# Patient Record
Sex: FEMALE
[De-identification: names, ages, dates, MRNs, and addresses within clinical notes are randomized per-mention and may not be internally consistent; named-entity substitution may affect disease eponyms.]

---

## 2021-02-23 ENCOUNTER — HOSPITAL ENCOUNTER (INPATIENT)
Dept: HOSPITAL 97 - 2ND-WC | Age: 26
LOS: 2 days | Discharge: HOME | End: 2021-02-25
Attending: SPECIALIST | Admitting: SPECIALIST
Payer: COMMERCIAL

## 2021-02-23 VITALS — BODY MASS INDEX: 26.9 KG/M2

## 2021-02-23 DIAGNOSIS — Z3A.39: ICD-10-CM

## 2021-02-23 DIAGNOSIS — Z23: ICD-10-CM

## 2021-02-23 DIAGNOSIS — Z67.90: ICD-10-CM

## 2021-02-23 DIAGNOSIS — Z20.822: ICD-10-CM

## 2021-02-23 PROCEDURE — 85025 COMPLETE CBC W/AUTO DIFF WBC: CPT

## 2021-02-23 PROCEDURE — 86592 SYPHILIS TEST NON-TREP QUAL: CPT

## 2021-02-23 PROCEDURE — 87086 URINE CULTURE/COLONY COUNT: CPT

## 2021-02-23 PROCEDURE — 81001 URINALYSIS AUTO W/SCOPE: CPT

## 2021-02-23 PROCEDURE — 87340 HEPATITIS B SURFACE AG IA: CPT

## 2021-02-23 PROCEDURE — 36415 COLL VENOUS BLD VENIPUNCTURE: CPT

## 2021-02-23 PROCEDURE — 87088 URINE BACTERIA CULTURE: CPT

## 2021-02-23 PROCEDURE — 86901 BLOOD TYPING SEROLOGIC RH(D): CPT

## 2021-02-24 LAB
HCT VFR BLD CALC: 33.7 % (ref 36–45)
LYMPHOCYTES # SPEC AUTO: 2.1 K/UL (ref 0.7–4.9)
PMV BLD: 9 FL (ref 7.6–11.3)
RBC # BLD: 3.79 M/UL (ref 3.86–4.86)

## 2021-02-24 PROCEDURE — 3E0234Z INTRODUCTION OF SERUM, TOXOID AND VACCINE INTO MUSCLE, PERCUTANEOUS APPROACH: ICD-10-PCS

## 2021-02-24 PROCEDURE — 0HQ9XZZ REPAIR PERINEUM SKIN, EXTERNAL APPROACH: ICD-10-PCS

## 2021-02-24 PROCEDURE — 10907ZC DRAINAGE OF AMNIOTIC FLUID, THERAPEUTIC FROM PRODUCTS OF CONCEPTION, VIA NATURAL OR ARTIFICIAL OPENING: ICD-10-PCS

## 2021-02-24 RX ADMIN — IBUPROFEN PRN MG: 200 TABLET, SUGAR COATED ORAL at 19:47

## 2021-02-24 NOTE — PREOPHP
Date of Admission:  2021



History Of Present Illness:  This is a 26-year-old  2, para 0, 1 spontaneous miscarriage, 39 w
eeks and 2 days, for delivery.  Full admission talk given.



Family History:  Maternal grandmother with hypertension.  Maternal grandmother also with multiple mye
eneida obtained.  One brother has a congenital birth defect only having 1 arm.



Past Medical History:  No serous past medical illnesses.  No STDs.



Past Surgical History:  No surgery.



Allergies:  NO ALLERGIES KNOWN.



Physical Examination:

HEENT:  Clear.  Pupils equal, round, reactive to light and accommodation.  Conjunctivae well perfused
.  No oral, lingual, or buccal lesions. 

Chest and Lungs:  Clear. 

Heart:  Without murmurs, thrills, heaves, or rubs. 

Breasts:  Without masses on previous visits. 

Abdomen:  Term size. 

Extremities:  Clear without edema, cyanosis, or clubbing. 

Vital Signs:  All stable. 

Pelvic:  The cervix is 1.5 to 2, 50% to 60% effaced, vertex well applied, -1 station, ruptured membra
stephanie, clear fluid.



Assessment/plan:  Anticipate the patient should go into more active labor pattern now and delivery so
metime later today.  Beta strep negative, immune to Rubella, Rh positive and COVID negative.





NBC/MODL

DD:  2021 07:14:00Voice ID:  [id number]

## 2021-02-24 NOTE — PN
The patient is tami regularly.  Baby had an episode of decelerations, but we shut off the Cameron
nasima and I have started back in for more than an hour.  Baby looks quite good.  Fluid is still clear. 
 The patient has progressed to 3.5 possibly to 4 cm, 80% to 90% effaced, baby is at 0 station.  The p
atient has had Stadol and Phenergan.  At this point, she wants epidural and I think this is a good ti
me to administer the epidural.  We will start hydration and anesthesia has been called.





NBC/MODL

DD:  02/24/2021 11:26:23Voice ID:  [id number]

DT:  02/24/2021 11:53:11Report ID:  [id number]

## 2021-02-24 NOTE — XMS REPORT
Continuity of Care Document

                          Created on:2021



Patient:ROBIN MUELLER

Sex:Female

:1995

External Reference #:[id number]





Demographics







                          Address                   [address] [address]

 

                          Home Phone                [phone]

 

                          Work Phone                [phone]

 

                          Mobile Phone              [phone]

 

                          Email Address             [email or web address]

 

                          Preferred Language        English

 

                          Marital Status            Unknown

 

                          Religion Affiliation     Unknown

 

                          Race                      Unknown

 

                          Additional Race(s)        Unavailable



                                                    White

 

                          Ethnic Group              Unknown









Author







                          Organization              [facility]

t

 

                          Address                   [address] [place] [address] [address]



                                                    [place] [address]

 

                          Phone                     [phone]









Care Team Providers







                    Name                Role                Phone

 

                    MELIA Hardy  Attending Clinician [phone]









Problems

This patient has no known problems.



Allergies, Adverse Reactions, Alerts

This patient has no known allergies or adverse reactions.



Medications

This patient has no known medications.



Procedures

This patient has no known procedures.



Encounters







        Start   End     Encounter Admission Attending Care    Care    Encounter 

Source



        Date/Time Date/Time Type    Type    Clinicians Facility Department ID   

   

 

        2020 Office          RAINE Lyman    [phone]

[id number] 



        14:37:03 15:50:11 Visit           [facility]  [phone]        

 



                                                REGIONAL [id number]         



                                                MATERNAL [phone]         



                                                & CHILD [address] [place] [address]                 







Results

This patient has no known results.

## 2021-02-25 VITALS — DIASTOLIC BLOOD PRESSURE: 74 MMHG | TEMPERATURE: 97.6 F | SYSTOLIC BLOOD PRESSURE: 127 MMHG

## 2021-02-25 RX ADMIN — IBUPROFEN PRN MG: 200 TABLET, SUGAR COATED ORAL at 08:02

## 2021-02-25 NOTE — DS
Hospital Course:  A 26-year-old  2, para 0, at 39 weeks 2 days.  Delivered a 5 pounds 13 ounce
s female.  Apgars 9 and 9.  Epidural anesthesia.  First-degree lacerations x2.  Schultze delivery of 
the placenta which was inspected and noted to be intact and normal 400 cc or less blood loss.  Rh pos
itive, immune to rubella, negative strep, negative COVID, postpartum afebrile, ambulating and voiding
.  Lochia is normal.  Requests no analgesics on dismissal.  Full dismissal instructions given.  She h
as already had her Tdap and flu shots.



Final Diagnosis:  Intrauterine gestation, 39 weeks and 2 days, vaginal delivery, epidural anesthesia.






JOE/LEVAR

DD:  2021 07:20:51Voice ID:  [id number]

DT:  2021 08:23:09Report ID:  [id number]

## 2021-02-26 LAB — RPR SER QL: (no result)

## 2021-03-01 NOTE — OP
Surgeon:  Jeremy Palacio MD



Indications And Procedure In Detail:  This is a 26-year-old,  2, para 0, 1 spontaneous miscarr
iage, at 39 weeks and 2 days, followed antepartum without complications.  Rh positive, immune to Rube
lla.  Negative Strep.  Negative COVID.  Came in today for labor induction.  Full preinduction discuss
ion.  Rupture of membranes this morning at approximately 1.5 to 2 cm, clear fluid.  The patient recei
dimitrios Stadol 1 mg IV, 25 mg of Phenergan IM.  At 3.5 cm was noted to be 80% to 90% effaced, 0 station, 
requested and received epidural anesthesia.  Shortly after the epidural was given, the patient was no
francy to be complete, second stage of 15 to 20 minutes.  Spontaneous vaginal delivery of 5 pounds 13 ou
nces female, Apgars 9 and 9.  Two small first-degree lacerations, 1 at the posterior fourchette, 1 on
 the left side of the introitus, both sutured with 2-0 chromic running locked stitch and interrupted 
sutures.  Significant vascular engorgement of this area.  Schultze delivery of the placenta, which in
spected and noted to be intact and normal.  About 400 cc blood loss mostly from the lacerations rathe
r than lochia.  The patient tolerated all procedures well.



Final Diagnoses:  Intrauterine gestation, 39 weeks 2 days, vaginal delivery, epidural anesthesia.  CO
VID negative, Strep negative.





NBC/MODL

DD:  2021 13:36:28Voice ID:  [id number]

DT:  2021 15:08:38Report ID:  [id number]

## 2021-12-20 ENCOUNTER — HOSPITAL ENCOUNTER (EMERGENCY)
Dept: HOSPITAL 97 - ER | Age: 26
Discharge: HOME | End: 2021-12-20
Payer: COMMERCIAL

## 2021-12-20 VITALS — OXYGEN SATURATION: 100 % | TEMPERATURE: 98.2 F

## 2021-12-20 VITALS — DIASTOLIC BLOOD PRESSURE: 93 MMHG | SYSTOLIC BLOOD PRESSURE: 123 MMHG

## 2021-12-20 DIAGNOSIS — O99.332: ICD-10-CM
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
## 2021-12-20 DIAGNOSIS — F17.210: ICD-10-CM

## 2021-12-20 DIAGNOSIS — Z3A.15: ICD-10-CM

## 2021-12-20 DIAGNOSIS — O9A.312: Primary | ICD-10-CM
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
## 2021-12-20 DIAGNOSIS — S61.411A: ICD-10-CM

## 2021-12-20 LAB
BUN BLD-MCNC: 14 MG/DL (ref 7–18)
GLUCOSE SERPLBLD-MCNC: 96 MG/DL (ref 74–106)
HCT VFR BLD CALC: 41.7 % (ref 36–45)
LYMPHOCYTES # SPEC AUTO: 1.7 K/UL (ref 0.7–4.9)
PMV BLD: 8.2 FL (ref 7.6–11.3)
POTASSIUM SERPL-SCNC: 3.6 MMOL/L (ref 3.5–5.1)
RBC # BLD: 4.68 M/UL (ref 3.86–4.86)
SP GR UR: 1.02 (ref 1–1.03)

## 2021-12-20 PROCEDURE — 96360 HYDRATION IV INFUSION INIT: CPT

## 2021-12-20 PROCEDURE — 76815 OB US LIMITED FETUS(S): CPT

## 2021-12-20 PROCEDURE — 81003 URINALYSIS AUTO W/O SCOPE: CPT

## 2021-12-20 PROCEDURE — 86900 BLOOD TYPING SEROLOGIC ABO: CPT

## 2021-12-20 PROCEDURE — 84702 CHORIONIC GONADOTROPIN TEST: CPT

## 2021-12-20 PROCEDURE — 99284 EMERGENCY DEPT VISIT MOD MDM: CPT

## 2021-12-20 PROCEDURE — 36415 COLL VENOUS BLD VENIPUNCTURE: CPT

## 2021-12-20 PROCEDURE — 73030 X-RAY EXAM OF SHOULDER: CPT

## 2021-12-20 PROCEDURE — 81025 URINE PREGNANCY TEST: CPT

## 2021-12-20 PROCEDURE — 85025 COMPLETE CBC W/AUTO DIFF WBC: CPT

## 2021-12-20 PROCEDURE — 80048 BASIC METABOLIC PNL TOTAL CA: CPT

## 2021-12-20 PROCEDURE — 73130 X-RAY EXAM OF HAND: CPT

## 2021-12-20 PROCEDURE — 86901 BLOOD TYPING SEROLOGIC RH(D): CPT

## 2021-12-20 NOTE — RAD REPORT
EXAM DESCRIPTION:  US - OB Limited - 12/20/2021 5:07 pm

 

CLINICAL HISTORY:  ABD CRAMPING, PREGNANT

 

COMPARISON:  OB Complete dated 11/4/2020

 

FINDINGS:  Normal size uterus is present with no myometrial mass lesion identified. No gestational sa
c or sac remnant identifiable. No intrauterine hematoma or mass. Small nabothian cyst identified.

 

Both ovaries seen with normal blood flow within the ovarian stroma. No adnexal abnormality seen. No b
lood or fluid in the cul de sac. Ovaries are normal size.

 

IMPRESSION:  No intrauterine gestational sac or sac remnant. No hematoma or other intrauterine abnorm
ality.

 

No ovarian or adnexal abnormality.
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
## 2021-12-20 NOTE — RAD REPORT
EXAM DESCRIPTION:  Shoulder  Right 2 View - 12/20/2021 4:20 pm

 

CLINICAL HISTORY:  PAIN

 

COMPARISON:  No comparisons

 

TECHNIQUE:  Internal and external rotation views of the right shoulder were obtained.

 

FINDINGS:  There is no fracture or dislocation.  AC joint is normal in appearance. No acute or suspic
ious findings.

 

IMPRESSION:  Negative two-view right shoulder examination.

## 2021-12-20 NOTE — RAD REPORT
EXAM DESCRIPTION:  RAD - Hand Right 3 View - 12/20/2021 4:20 pm

 

CLINICAL HISTORY:  PAIN

 

COMPARISON:  No comparisons

 

FINDINGS:  No fracture is identified. There is no dislocation or periosteal reaction noted.  No forei
gn body or significant soft tissue abnormality.

 

IMPRESSION:  Negative right hand examination.

## 2021-12-20 NOTE — XMS REPORT
Continuity of Care Document

                          Created on:2021



Patient:ROBIN MUELLER

Sex:Female

:1995

External Reference #:[id number]





Demographics







                          Address                   [address] [address]

 

                          Home Phone                [phone]

 

                          Work Phone                [phone]

 

                          Mobile Phone              [phone]

 

                          Email Address             [email or web address]

 

                          Preferred Language        English

 

                          Marital Status            Unknown

 

                          Druze Affiliation     Unknown

 

                          Race                      Unknown

 

                          Additional Race(s)        White



                                                    Unavailable

 

                          Ethnic Group              Unknown









Author







                          Organization              [facility] [place], [place]

t

 

                          Address                   [address] [place] [address] [address]



                                                    [place] [address]

 

                          Phone                     [phone]









Care Team Providers







                    Name                Role                Phone

 

                    MARELY Belle       Attending Clinician [phone]

 

                    Doctor Unassigned,  Name Attending Clinician Unavailable

 

                    MELIA SOFIA        Attending Clinician Unavailable

 

                    Nura ALVARADO  C  Attending Clinician [phone]









Payers







           Payer Name Policy Type Policy Number Effective Date Expiration Date [facility]            [id number]  [id number]            



           [place] MEDICAID                       00:00:00              

 

           [employer] [place]            [id number]  2017            



           WOMEN                            00:00:00              







Problems







       Condition Condition Condition Status Onset  Resolution Last   Treating Co

mments 

Source



       Name   Details Category        Date   Date   Treatment Clinician        



                                                 Date                 

 

       Over   Over   Disease Active                              [facility]



       weight weight               3-16                               ity of



                                   00:00:                             [address]

 

       Herpes, Herpes, Disease Active                              [facility]



       vulvar vulvar               3-16                               ity of



                                   00:00:                             [address]

 

       Contracept Contracept Disease Active                              U

nivers



       abdiel    abdiel                  3-06                               ity of



       management management               00:00:                             [place]

[place]



                                   [address] [place] [address]

 

       Vaginal Vaginal Disease Active                              [facility]



       yeast  yeast                3-06                               [facility]



       infection infection               00:00:                             [place]

[place]



                                   [address] [place] [address]







Allergies, Adverse Reactions, Alerts







       Allergy Allergy Status Severity Reaction(s) Onset  Inactive Treating Comm

ents 

Source



       Name   Type                        Date   Date   Clinician        

 

       NO KNOWN Drug   Active                                           [facility]



       ALLERGIE Class                                                   ity of



       S                                                              [facility]







Social History







           Social Habit Start Date Stop Date  Quantity   Comments   Source

 

           Exposure to                       Not sure              [facility]



           SARS-CoV-2                                             [facility]



           (event)                                                [facility]

 

           Alcohol intake 2021 Current               [facility]



                      00:00:00   00:00:00   non-drinker of            [place] [facility]

[facility]za



                                            alcohol               [facility]



                                            (finding)             

 

           Tobacco use and 2021 Never used            [facility]

y of



           exposure   00:00:00   00:00:00                         [facility]

 

           Sex Assigned At 1995                       [facility]

y of



           Birth      00:00:00   00:00:00                         [facility]









                Smoking Status  Start Date      Stop Date       Source

 

                Never smoker                                    [facility]







Medications







       Ordered Filled Start  Stop   Current Ordering Indication Dosage Frequency

 Signature

                    Comments            Components          Source



     Medication Medication Date Date Medication? Clinician                (SIG) 

          



     Name Name                                                   

 

     acetaminoph      2021- No             650mg      650 mg,           [facility]

n[facility]



     en                                  Oral,           ity of



     (TYLENOL)      05:00: 03:59                          ONCE, 1           [place]

[facility]



     tablet 650      00   :00                           dose, Sun           Medi

nitza



     mg                                           21 at           [facility]



                                                  0000, ASAP           

 

     azithromyci      2021- No        [id number] 1000mg      Take 2       

    [facility]



     n 500 mg                                tablets by           ity 

of



     tablet      00:00: 04:59                          mouth once           [place]

[facility]



               00   :00                           now for 1           [facility]



                                                  dose.           [facility]

 

     azithromyci      2021- No        [id number] 1000mg      Take 2       

    [facility]



     n 500 mg                                tablets by           ity 

of



     tablet      00:00: 04:59                          mouth once           [place]

[facility]



               00   :00                           now for 1           [facility]



                                                  dose.           [facility]

 

     azithromyci      2021- No        [id number] 1000mg      Take 2       

    [facility]



     n 500 mg                                tablets by           ity 

of



     tablet      00:00: 04:59                          mouth once           [place]

[facility]



               00   :00                           now for 1           [facility]



                                                  dose.           [facility]

 

     azithromyci      2021- No        [id number] 1000mg      Take 2       

    [facility]



     n 500 mg                                tablets by           ity 

of



     tablet      00:00: 04:59                          mouth once           [place]

[facility]



               00   :00                           now for 1           [facility]



                                                  dose.           [facility]

 

     azithromyci      2021- No        [id number] 1000mg      Take 2       

    [facility]



     n 500 mg                                tablets by           ity 

of



     tablet      00:00: 04:59                          mouth once           [place]

[facility]



               00   :00                           now for 1           [facility]



                                                  dose.           [facility]

 

     acyclovir       2020- No        [id number] 400mg      Take 1          

 [facility]



     400 mg      3-16 04-16                          tablet by           ity of



     tablet      00:00: 04:59                          mouth 2           [place]



               00   :00                           (two)           [facility]



                                                  times           [facility]



                                                  daily for           



                                                  30 days.           

 

     acyclovir       2020- No        [id number] 400mg      Take 1          

 [facility]



     400 mg      3-16 -16                          tablet by           ity of



     tablet      00:00: 04:59                          mouth 2           [place]



               00   :00                           (two)           [facility]



                                                  times           [facility]



                                                  daily for           



                                                  30 days.           

 

     medroxyPROG            Yes       [id number] 150mg                     U

nivers



     ESTERone      1-26                                              ity of



     (DEPO-PROVE      16:45:                                              [place]



     RA)       00                                                Medical



     injection                                                        [facility]



     150 mg                                                        

 

     medroxyPROG            Yes       [id number] 150mg                     U

nivers



     ESTERone      -26                                              ity of



     (DEPO-PROVE      16:45:                                              [place]



     RA)       00                                                Medical



     injection                                                        [facility]



     150 mg                                                        

 

     medroxyPROG            Yes       [id number] 150mg                     U

nivers



     ESTERone      -                                              ity of



     (DEPO-PROVE      16:45:                                              [place]



     RA)       00                                                Medical



     injection                                                        [facility]



     150 mg                                                        

 

     medroxyPROG            Yes       [id number] 150mg                     U

nivers



     ESTERone      -                                              ity of



     (DEPO-PROVE      16:45:                                              [place]



     RA)       00                                                Medical



     injection                                                        Branch



     150 mg                                                        

 

     medroxyPROG      2021- No        [id number] 150mg                     

[facility]



     ESTERone      -                                         ity of



     (DEPO-PROVE      16:45: 19:24                                         [place]



     RA)       00   :33                                          Medical



     injection                                                        [facility]



     150 mg                                                        

 

     medroxyPROG      2021- No        [id number] 150mg                     

[facility]



     ESTERone      -                                         ity of



     (DEPO-PROVE      16:45: 19:24                                         [place]



     RA)       00   :33                                          Medical



     injection                                                        [facility]



     150 mg                                                        

 

     medroxyPROG      2021- No        [id number] 150mg                     

[facility]



     ESTERone      -                                         ity of



     (DEPO-PROVE      16:45: 19:24                                         [place]



     RA)       00   :33                                          Medical



     injection                                                        [facility]



     150 mg                                                        

 

     Condoms            Yes       [id number]           Use as           Univ

ers



     Latex      6-17                               directed           ity of



     Lubricated      00:00:                                              [place]



     (KIMONO      00                                                Medical



     TEXTURED                                                        Branch



     CONDOMS)                                                        



     Estefany                                                        

 

     Condoms            Yes       [id number]           Use as           Univ

ers



     Latex      6-17                               directed           ity of



     Lubricated      00:00:                                              [place]



     (KIMONO                                                      Medical



     TEXTURED                                                        Branch



     CONDOMS)                                                        



     Estefany                                                        

 

     Condoms            Yes       [id number]           Use as           Univ

ers



     Latex      6-17                               directed           ity of



     Lubricated      00:00:                                              [place]



     (KIMONO      00                                                Medical



     TEXTURED                                                        Branch



     CONDOMS)                                                        



     Estefany                                                        

 

     Condoms      -0      Yes       [id number]           Use as           Univ

ers



     Latex      6-17                               directed           ity of



     Lubricated      00:00:                                              [place]



     (KIMONO      00                                                Medical



     TEXTURED                                                        Branch



     CONDOMS)                                                        



     Estefany                                                        

 

     Condoms      -0      Yes       [id number]           Use as           Univ

ers



     Latex      6-17                               directed           ity of



     Lubricated      00:00:                                              [place]



     (KIMONO      00                                                Medical



     TEXTURED                                                        Branch



     CONDOMS)                                                        



     Estefany                                                        

 

     Condoms      -0      Yes       [id number]           Use as           Univ

ers



     Latex      6-17                               directed           ity of



     Lubricated      00:00:                                              [place]



     (KIMONO      00                                                Medical



     TEXTURED                                                        Branch



     CONDOMS)                                                        



     Estefany                                                        

 

     Condoms      -      Yes       [id number]           Use as           Univ

ers



     Latex      6-17                               directed           ity of



     Lubricated      00:00:                                              [place]



     (KIMONO      00                                                Medical



     TEXTURED                                                        Branch



     CONDOMS)                                                        



     Estefany                                                        

 

     Condoms      -      Yes       [id number]           Use as           Univ

ers



     Latex      6-17                               directed           ity of



     Lubricated      00:00:                                              [place]



     (KIMONO      00                                                Medical



     TEXTURED                                                        Branch



     CONDOMS)                                                        



     Estefany                                                        

 

     Condoms      -0      Yes       [id number]           Use as           Univ

ers



     Latex      6-17                               directed           ity of



     Lubricated      00:00:                                              [place]



     (KIMONO      00                                                Medical



     TEXTURED                                                        Branch



     CONDOMS)                                                        



     Estefany                                                        

 

     Condoms      -      Yes       [id number]           Use as           Univ

ers



     Latex      6-17                               directed           ity of



     Lubricated      00:00:                                              [place]



     (KIMONO      00                                                Medical



     TEXTURED                                                        Branch



     CONDOMS)                                                        



     Estefany                                                        

 

     Condoms      -0      Yes       [id number]           Use as           Univ

ers



     Latex      6-17                               directed           ity of



     Lubricated      00:00:                                              [place]



     (KIMONO      00                                                Medical



     TEXTURED                                                        Branch



     CONDOMS)                                                        



     Estefany                                                        

 

     Condoms      -0      Yes       [id number]           Use as           Univ

ers



     Latex      6-17                               directed           ity of



     Lubricated      00:00:                                              [place]



     (KIMONO      00                                                Medical



     TEXTURED                                                        Branch



     CONDOMS)                                                        



     Estefany                                                        

 

     Condoms      -0      Yes       [id number]           Use as           Univ

ers



     Latex      6-17                               directed           ity of



     Lubricated      00:00:                                              [place]



     (KIMONO      00                                                Medical



     TEXTURED                                                        Branch



     CONDOMS)                                                        



     Estefany                                                        

 

     Condoms      -      Yes       [id number]           Use as           Univ

ers



     Latex      6-17                               directed           ity of



     Lubricated      00:00:                                              [place]



     (KIMONO      00                                                Medical



     TEXTURED                                                        Branch



     CONDOMS)                                                        



     Estefany                                                        







Immunizations







           Ordered    Filled Immunization Date       Status     Comments   Sour

e



           Immunization Name Name                                        

 

           Influenza Virus            2020 Completed             [facility]

y of



           Vaccine Quad .5 mL            00:00:00                         [facility]



           IM 6+ MO                                               [facility]

 

           HPV9                  2020 Completed             [facility]



                                 00:00:00                         [facility]

 

           Influenza Virus            2020 Completed             [facility]

y of



           Vaccine Quad .5 mL            00:00:00                         [place] 

[facility]



           IM 6+ MO                                               [facility]

 

           HPV9                  2020 Completed             [facility]



                                 00:00:00                         [facility]

 

           Influenza Virus            2020 Completed             [facility]

y of



           Vaccine Quad .5 mL            00:00:00                         [place] 

[facility]



           IM 6+ MO                                               [facility]

 

           HPV9                  2020 Completed             [facility]



                                 00:00:00                         [facility]

 

           Influenza Virus            2020 Completed             [facility]

y of



           Vaccine Quad .5 mL            00:00:00                         [place] 

[facility]



           IM 6+ MO                                               [facility]

 

           HPV9                  2020 Completed             [facility]



                                 00:00:00                         [facility]

 

           Influenza Virus            2020 Completed             [facility]

y of



           Vaccine Quad .5 mL            00:00:00                         [place] 

[facility]



            6+ MO                                               [facility]

 

           HPV9                  2020 Completed             [facility]



                                 00:00:00                         [facility]

 

           Influenza Virus            2020 Completed             [facility]

y of



           Vaccine Quad .5 mL            00:00:00                         [place] 

[facility]



           IM 6+ MO                                               [facility]

 

           HPV9                  2020 Completed             [facility]



                                 00:00:00                         [facility]

 

           Influenza Virus            2020 Completed             [facility]

y of



           Vaccine Quad .5 mL            00:00:00                         [place] 

[facility]



           IM 6+ MO                                               [facility]

 

           HPV9                  2020 Completed             [facility]



                                 00:00:00                         [facility]

 

           Influenza Virus            2020 Completed             [facility]

y of



           Vaccine Quad .5 mL            00:00:00                         [place] 

[facility]



            6+ MO                                               [facility]

 

           HPV9                  2020 Completed             [facility]



                                 00:00:00                         [facility]

 

           Influenza Virus            2020 Completed             [facility]

y of



           Vaccine Quad .5 mL            00:00:00                         [place] 

[facility]



           IM 6+ MO                                               [facility]

 

           HPV9                  2020 Completed             [facility]



                                 00:00:00                         [facility]

 

           Influenza Virus            2020 Completed             Universit

y of



           Vaccine Quad .5 mL            00:00:00                         [place] 

[facility]



           IM 6+ MO                                               Branch

 

           HPV9                  2020 Completed             University of



                                 00:00:00                         [facility]

 

           Influenza Virus            2020 Completed             Universit

y of



           Vaccine Quad .5 mL            00:00:00                         [place] 

[facility]



           IM 6+ MO                                               Branch

 

           HPV9                  2020 Completed             University of



                                 00:00:00                         [facility]

 

           Influenza Virus            2020 Completed             Universit

y of



           Vaccine Quad .5 mL            00:00:00                         [facility]



           IM 6+ MO                                               Branch

 

           HPV9                  2020 Completed             [facility]



                                 00:00:00                         [facility]



                                                                  Branch

 

           Influenza Virus            2020 Completed             Universit

y of



           Vaccine Quad .5 mL            00:00:00                         [facility]



           IM 6+ MO                                               Branch

 

           HPV9                  2020 Completed             [facility]



                                 00:00:00                         [facility]



                                                                  Branch

 

           HPV                   2016 Completed             [facility]



                                 00:00:00                         [facility]



                                                                  Branch

 

           HPV                   2016 Completed             [facility]



                                 00:00:00                         [facility]



                                                                  Branch

 

           HPV                   2016 Completed             [facility]



                                 00:00:00                         [facility]



                                                                  Branch

 

           HPV                   2016 Completed             [facility]



                                 00:00:00                         [facility]



                                                                  Branch

 

           HPV                   2016 Completed             [facility]



                                 00:00:00                         [facility]



                                                                  Branch

 

           HPV                   2016 Completed             [facility]



                                 00:00:00                         [facility]



                                                                  Branch

 

           HPV                   2016 Completed             [facility]



                                 00:00:00                         [facility]



                                                                  Branch

 

           HPV                   2016 Completed             [facility]



                                 00:00:00                         [facility]



                                                                  Branch

 

           HPV                   2016 Completed             [facility]



                                 00:00:00                         [facility]



                                                                  Branch

 

           HPV                   2016 Completed             [facility]



                                 00:00:00                         [facility]



                                                                  Branch

 

           HPV                   2016 Completed             [facility]



                                 00:00:00                         [facility]



                                                                  Branch

 

           HPV                   2016 Completed             [facility]



                                 00:00:00                         [facility]



                                                                  Branch

 

           HPV                   2016 Completed             [facility]



                                 00:00:00                         [facility]



                                                                  Branch

 

           HPV                   2016 Completed             [facility]



                                 00:00:00                         [facility]



                                                                  Branch

 

           HPV                   2015 Completed             [facility]



                                 00:00:00                         [facility]



                                                                  Branch

 

           HPV                   2015 Completed             [facility]



                                 00:00:00                         [facility]



                                                                  Branch

 

           HPV                   2015 Completed             [facility]



                                 00:00:00                         [facility]



                                                                  Branch

 

           HPV                   2015 Completed             [facility]



                                 00:00:00                         [facility]



                                                                  Branch

 

           HPV                   2015 Completed             [facility]



                                 00:00:00                         [facility]



                                                                  Branch

 

           HPV                   2015 Completed             [facility]



                                 00:00:00                         [facility]



                                                                  Branch

 

           HPV                   2015 Completed             [facility]



                                 00:00:00                         [facility]



                                                                  Branch

 

           HPV                   2015 Completed             [facility]



                                 00:00:00                         [facility]



                                                                  Branch

 

           HPV                   2015 Completed             [facility]



                                 00:00:00                         [facility]



                                                                  Branch

 

           HPV                   2015 Completed             [facility]



                                 00:00:00                         [facility]



                                                                  Branch

 

           HPV                   2015 Completed             [facility]



                                 00:00:00                         [facility]



                                                                  Branch

 

           HPV                   2015 Completed             [facility]



                                 00:00:00                         [facility]



                                                                  Branch

 

           HPV                   2015 Completed             [facility]



                                 00:00:00                         [facility]



                                                                  Branch

 

           HPV                   2015 Completed             [facility]



                                 00:00:00                         [facility]



                                                                  Branch

 

           TDAP                  2014 Completed             [facility]



                                 00:00:00                         [facility]



                                                                  Branch

 

           TDAP                  2014 Completed             [facility]



                                 00:00:00                         [facility]



                                                                  Branch

 

           TDAP                  2014 Completed             [facility]



                                 00:00:00                         [facility]



                                                                  Branch

 

           TDAP                  2014 Completed             [facility]



                                 00:00:00                         [facility]



                                                                  Branch

 

           TDAP                  2014 Completed             [facility]



                                 00:00:00                         [facility]



                                                                  Branch

 

           TDAP                  2014 Completed             [facility]



                                 00:00:00                         [facility]



                                                                  Branch

 

           TDAP                  2014 Completed             [facility]



                                 00:00:00                         [facility]



                                                                  Branch

 

           TDAP                  2014 Completed             [facility]



                                 00:00:00                         [facility]



                                                                  Branch

 

           TDAP                  2014 Completed             [facility]



                                 00:00:00                         [facility]



                                                                  Branch

 

           TDAP                  2014 Completed             [facility]



                                 00:00:00                         [facility]



                                                                  Branch

 

           Tdap                  2014 Completed             [facility]



                                 00:00:00                         [facility]



                                                                  Branch

 

           Tdap                  2014 Completed             [facility]



                                 00:00:00                         [facility]



                                                                  Branch

 

           Tdap                  2014 Completed             [facility]



                                 00:00:00                         [facility]



                                                                  Branch

 

           TDAP                  2014 Completed             [facility]



                                 00:00:00                         [facility]



                                                                  Branch







Vital Signs







             Vital Name   Observation Time Observation Value Comments     Source

 

             Systolic blood 2021 03:48:00 141 mm[Hg]                Univer

sity of



             pressure                                            [facility]

 

             Diastolic blood 2021 03:48:00 87 mm[Hg]                 Unive

rsity of



             pressure                                            [facility]

 

             Heart rate   2021 03:48:00 88 /min                   [facility]

 

             Body temperature 2021 03:48:00 38 Izabella                    [facility]

 

             Respiratory rate 2021 03:48:00 20 /min                   [facility]

 

             Body weight  2021 03:48:00 66.225 kg                 [facility]

 

             BMI          2021 03:48:00 23.57 kg/m2               [facility]

 

             Oxygen saturation in 2021 03:48:00 100 /min                  

[facility]



             Arterial blood by                                        [place] [facility]

[facility]za



             Pulse oximetry                                        Branch

 

             Systolic blood 2021 19:23:00 119 mm[Hg]                Univer

sity of



             pressure                                            [facility]



                                                                 Branch

 

             Diastolic blood 2021 19:23:00 76 mm[Hg]                 Unive

rsity of



             pressure                                            [facility]

 

             Heart rate   2021 19:23:00 75 /min                   Universi

ty of



                                                                 [facility]

 

             Body temperature 2021 19:23:00 36.44 Izabella                 Univ

ersity of



                                                                 [facility]

 

             Respiratory rate 2021 19:23:00 16 /min                   Univ

ersity of



                                                                 [facility]

 

             Body height  2021 19:23:00 167.6 cm                  Universi

ty of



                                                                 [facility]

 

             Body weight  2021 19:23:00 65.273 kg                 Universi

ty of



                                                                 [facility]

 

             BMI          2021 19:23:00 23.23 kg/m2               Universi

ty of



                                                                 [facility]

 

             Systolic blood 2020 19:46:00 120 mm[Hg]                Univer

sity of



             pressure                                            [facility]



                                                                 Branch

 

             Diastolic blood 2020 19:46:00 74 mm[Hg]                 Unive

rsity of



             pressure                                            [facility]

 

             Heart rate   2020 19:46:00 81 /min                   Universi

ty of



                                                                 [facility]

 

             Body temperature 2020 19:46:00 36.67 Izabella                 Univ

ersity of



                                                                 [facility]

 

             Respiratory rate 2020 19:46:00 16 /min                   Univ

ersity of



                                                                 [facility]

 

             Body height  2020 19:46:00 167.6 cm                  Universi

ty of



                                                                 [facility]

 

             Body weight  2020 19:46:00 76.233 kg                 Universi

ty of



                                                                 [facility]



                                                                 Branch

 

             BMI          2020 19:46:00 27.13 kg/m2               Universi

ty of



                                                                 [facility]



                                                                 Branch

 

             Systolic blood 2020 19:46:00 120 mm[Hg]                Univer

sity of



             pressure                                            [facility]



                                                                 Branch

 

             Diastolic blood 2020 19:46:00 74 mm[Hg]                 Unive

rsity of



             pressure                                            [facility]

 

             Heart rate   2020 19:46:00 81 /min                   Universi

ty of



                                                                 [facility]

 

             Body temperature 2020 19:46:00 36.67 Izabella                 Univ

ersity of



                                                                 [facility]

 

             Respiratory rate 2020 19:46:00 16 /min                   [facility]

 

             Body height  2020 19:46:00 167.6 cm                  [facility]

 

             Body weight  2020 19:46:00 76.233 kg                 [facility]

 

             BMI          2020 19:46:00 27.13 kg/m2               [facility]







Procedures







                Procedure       Date / Time Performed Performing Clinician Erik MAGANAID-19 (ID NOW RAPID 2021 04:00:00 Lizzy Moss [facility]



                TESTING)                                        [facility]

 

                NOTICE OF PRIVACY 2021 03:23:08 Doctor Unassigned, No [facility]



                PRACTICES                       [facility]

 

                CONSENT/REFUSAL FOR 2021 03:21:46 Doctor Unassigned, No [facility]



                DIAGNOSIS AND                   [facility]



                TREATMENT                                       

 

                POCT PREGNANCY TEST 2021 19:33:00 Latosha Sofia [facility]

 

                ASSIGNMENT OF BENEFITS 2021 19:04:18 Doctor Unassigned, No

 [facility]

 

                POCT PREGNANCY TEST 2020 21:48:00 Latosha Sofia [facility]

 

                PAP SMEAR-LIQUID 2020 20:49:00 Latosha Sofia [facility]



                BASED-CP                                        [facility]

 

                HIV 1/2 AG-AB WITH 2020 20:45:00 Latosha Sofia [facility]



                REFLEX                                          [facility]

 

                GARDASIL 9 (HPV 9V) 2020 20:02:45 Latosha Sofia Uni

[facility]



                VACCINE                                         [facility]

 

                FLU VACC ([id number]), 2020 20:01:51 [facility]ha [facility]



                6+ MONTHS, IM, QUAD                                 [facility]

ch

 

                ASSIGNMENT OF BENEFITS 2020 19:15:35 Doctor Unassigned, No

 [facility]







Encounters







        Start   End     Encounter Admission Attending Care    Care    Encounter 

Source



        Date/Time Date/Time Type    Type    Clinicians Facility Department ID   

   

 

        2021         Emergency                 [facility]    [id number] 

Univers



        12:14:10                                                         ity [facility]

 

        2021 Outpatient R               [facility]    [id number]

-20 Univers



        10:30:00 10:30:00                                         [id number]  ity [facility]

 

        2021 Outpatient R               [facility]    [id number]

[id number] Univers



        10:30:00 10:30:00                                                 ity of



                                                                        [facility]

 

        2021 Outpatient R               [facility]    [id number]

-20 Univers



        13:45:00 13:45:00                                         [id number]  ity of



                                                                        [facility]

 

        2021 Outpatient R               [facility]    [id number]

-20 Univers



        15:00:00 15:00:00                                         [id number]  ity [facility]

 

        2021 Outpatient R               [facility]    [id number]

-20 Univers



        15:30:00 15:30:00                                         [id number]  ity [facility]

 

        2021 Outpatient R               [facility]    [id number]

-20 Univers



        10:30:00 10:30:00                                         [id number]  ity [facility]

 

        2021 Outpatient R               [facility]    [id number]

[id number] Univers



        10:30:00 10:30:00                                                 it[facility]

 

        2021 [facility]         [facility]    [id number]

[id number] Univers



        22:54:00 23:45:00                 Janeth S Carlos [id number]         i

ty of



                                                [place] [id number]         [facility]  [phone]         Medi

nitza



                                                        084             Branch

 

        2021 Orders          Doctor  JOY    [id number]

[id number] Univers



        00:00:00 00:00:00 Only            Unassigned, TANVI   [id number]       

  ity of



                                        [facility] [id number]         [place]

as



                                                        [phone]         Medi

nitza



                                                        009             Branch

 

        [facility]       [facility]    [id number]

3N-20 Univers



        13:30:00 13:30:00                 LATOSHA                 [id number]  ity o

[facility]

 

        2021 Outpatient R       NURA [facility]    [id number]

[id number] Univers



        13:30:00 13:30:00                 LATOSHA brodyy o

[facility]

 

        2021 Telephone         [facility]    [id number]

[id number] Univers



        00:00:00 00:00:00                 Latosha C [facility]  [id number]        

 ity of



                                                REGIONAL [id number]         [place]

as



                                                MATERNAL [phone]         Med

ical



                                                & CHILD [address] [place] [address]                 

 

        2021 Office          [facility]    [id number]

[id number] Univers



        14:04:52 15:11:59 Visit           Latosha CÁRDENAS OB/GYN  [id number]        

 ity of



                                                REGIONAL [id number]         [place]

as



                                                MATERNAL [phone]         Med

ical



                                                & [address]                 

 

        2021 Outpatient R       NURA [facility]    [id number]

[id number] Univers



        13:45:00 13:45:00                 LATOSHA jaime o

[facility]

 

        20[facility] R       [facility]    [id number]

3N[facility]20 [employer]



        10:45:00 10:45:00                 LATOSHA                 [id number]  addison o

[facility]

 

        2021 Orders          Doctor HAMILTON    [id number]

79 Univers



        00:00:00 00:00:00 Only            Unassigned, TANVI   350.1.13.10       

  ity of



                                        [facility] [id number]         [place]

as



                                                        [phone]         [address]

 

        2020 Office          [facility]    [id number]

[id number] 



        14:37:03 15:50:11 Visit           Latosha MELIA [facility]  350.1.13.10        

 



                                                REGIONAL 4.2.7.2.686         



                                                MATERNAL [phone]         



                                                & CHILD [address] [place] [address]                 

 

        2020 Office          [facility]    [id number]

[id number] Univers



        14:37:03 15:50:11 Visit           Latosha CÁRDENAS OB/GYN  350.1.13.10        

 ity of



                                                REGIONAL [id number]         [place]

as



                                                MATERNAL [phone]         Med

ical



                                                & CHILD [address] [place] [address]                 

 

        2020 Outpatient R       [facility]    [id number]

3N-20 [facility]



        14:30:00 14:30:00                 LATOSHA                 [id number]  ity o

f



                                                                        [facility]

 

        2020 Outpatient R       [facility]    [id number]

[id number] Univers



        14:30:00 14:30:00                 LATOSHA lamas

khurram



                                                                        [facility]

 

        2020 Orders          Doctor  JOY    [id number]

28 Univers



        00:00:00 00:00:00 Only            Unassigned, TANVI   350.1.13.10       

  ity of



                                        [place] [id number]         [place]

as



                                                        [phone]         [address]







Results







           Test Description Test Time  Test Comments Results    Result Comments 

Source









                    COVID-19 (ID NOW RAPID TESTING) 2021 04:15:26 









                      Test Item  Value      Reference Range Interpretation Comme

nts









             SARS-CoV-2 Rapid ID NOW (test code Positive     Not Detected A     

       



             = 73092-4)                                          

 

             JADA (test code = JDAA) ID NOW COVID-19 Assay is an                  

         



                          isothermal nucleic acid                           



                          amplification test intended for                       

    



                          the qualitative detection of                          

 



                          nucleic acid from SARS-CoV-2 viral                    

       



                          RNA in nasopharyngeal (NP)                           



                          specimens. It is used under                           



                          Emergency Use Authorization (EUA)                     

      



                          by FDA. The limit of detection                        

   



                          (LOD) of the assay is 125 Genome                      

     



                          Equivalents/mL. A positive result                     

      



                          is indicative of the presence of                      

     



                          SARS-CoV-2 RNA. ?Clinical                           



                          correlation with patient history                      

     



                          and other diagnostic information                      

     



                          is necessary to determine patient                     

      



                          infection status. A negative (Not                     

      



                          Detected) result does not preclude                    

       



                          SARS-CoV-2 infection. In patients                     

      



                          with clinical symptoms and other                      

     



                          tests that are consistent with                        

   



                          SARS-CoV-2 infection, negative                        

   



                          results should be treated as                          

 



                          presumptive negative and a new                        

   



                          specimen should be tested with                        

   



                          alternative PCR molecular test.                       

    



                          Invalid: Please collect a new                         

  



                          specimen for repeat patient                           



                          testing if clinically indicated.                      

     

 

             Lab Interpretation (test code = Abnormal                           

    



             86671-9)                                            



[facility] PREGNANCY [id number] 19:35:00





             Test Item    Value        Reference Range Interpretation Comments

 

             POCT PREG (test code = 1605) Negative                              

 

 

             On board controls acceptable with C Yes                            

        



             Line (test code = 3574)                                        

 

             POCT PREG LOT # (test code = 3575)                                 

       

 

             POCT PREG TEST  DATE (test                                   

     



             code = 3576)                                        



[facility] PREGNANCY [id number] 19:35:00





             Test Item    Value        Reference Range Interpretation Comments

 

             POCT PREG (test code = 1605) Negative                              

 

 

             On board controls acceptable with C Yes                            

        



             Line (test code = 3574)                                        

 

             POCT PREG LOT # (test code = 3575)                                 

       

 

             POCT PREG TEST  DATE (test                                   

     



             code = 3576)                                        



[facility] PREGNANCY [id number] 19:35:00





             Test Item    Value        Reference Range Interpretation Comments

 

             POCT PREG (test code = 1605) Negative                              

 

 

             On board controls acceptable with C Yes                            

        



             Line (test code = 3574)                                        

 

             POCT PREG LOT # (test code = 3575)                                 

       

 

             POCT PREG TEST  DATE (test                                   

     



             code = 3576)                                        



[facility] [id number] 05:56:00





             Test Item    Value        Reference Range Interpretation Comments

 

             HIV          Negative     Negative                  



             Semi-quantitative                                        



             (test code =                                        



             45825-9)                                            

 

             JADA (test code = Non-reactive for HIV-1                           



             JADA)         antigen and HIV-1/HIV-2                           



                          antibodies. ?No                           



                          laboratory evidence of                           



                          HIV infection. ?Repeat in                           



                          2-4 weeks if acute HIV                           



                          infection is suspected.                           



[facility] [id number] 05:56:00





             Test Item    Value        Reference Range Interpretation Comments

 

             HIV          Negative     Negative                  



             Semi-quantitative                                        



             (test code =                                        



             01096-2)                                            

 

             JADA (test code = Non-reactive for HIV-1                           



             JADA)         antigen and HIV-1/HIV-2                           



                          antibodies. ?No                           



                          laboratory evidence of                           



                          HIV infection. ?Repeat in                           



                          2-4 weeks if acute HIV                           



                          infection is suspected.                           



[facility] PREGNANCY [id number] 21:48:00





             Test Item    Value        Reference Range Interpretation Comments

 

             POCT PREG (test code = 1605) Negative                              

 

 

             On board controls acceptable with C Yes                            

        



             Line (test code = 3574)                                        

 

             POCT PREG LOT # (test code = 3575)                                 

       

 

             POCT PREG TEST  DATE (test                                   

     



             code = 3576)                                        



[facility] [id number] 21:48:00





             Test Item    Value        Reference Range Interpretation Comments

 

             POCT PREG (test code = 1605) Negative                              

 

 

             On board controls acceptable with C Yes                            

        



             Line (test code = 3574)                                        

 

             POCT PREG LOT # (test code = 3575)                                 

       

 

             POCT PREG TEST  DATE (test                                   

     



             code = 3576)                                        



[facility]
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
## 2022-06-13 ENCOUNTER — HOSPITAL ENCOUNTER (EMERGENCY)
Dept: HOSPITAL 97 - ER | Age: 27
Discharge: HOME | End: 2022-06-13
Payer: COMMERCIAL

## 2022-06-13 VITALS — DIASTOLIC BLOOD PRESSURE: 77 MMHG | SYSTOLIC BLOOD PRESSURE: 122 MMHG | TEMPERATURE: 98.6 F | OXYGEN SATURATION: 99 %

## 2022-06-13 DIAGNOSIS — Z20.822: ICD-10-CM

## 2022-06-13 DIAGNOSIS — J06.9: Primary | ICD-10-CM

## 2022-06-13 PROCEDURE — 99283 EMERGENCY DEPT VISIT LOW MDM: CPT

## 2022-06-13 PROCEDURE — 87804 INFLUENZA ASSAY W/OPTIC: CPT

## 2022-06-13 NOTE — XMS REPORT
Continuity of Care Document

                            Created on:2022



Patient:ROBIN MUELLER

Sex:Female

:1995

External Reference #:[id number]





Demographics







                          Address                   [address]  



                                                    [place] [address]

 

                          Home Phone                [phone]

 

                          Work Phone                [phone]

 

                          Mobile Phone              [phone]

 

                          Email Address             [email or web address]

 

                          Preferred Language        English

 

                          Marital Status            Unknown

 

                          Pentecostalism Affiliation     Unknown

 

                          Race                      Unknown

 

                          Additional Race(s)        Unavailable



                                                    White



                                                    Unavailable

 

                          Ethnic Group              Unknown









Author







                          Organization              [facility]

t

 

                          Address                   [address]



                                                    [place] [address]

 

                          Phone                     [phone]









Care Team Providers







                    Name                Role                Phone

 

                    MARELY Belle       Attending Clinician [phone]

 

                    Doctor Unassigned,  Name Attending Clinician Unavailable

 

                    MELIA SOFIA        Attending Clinician Unavailable

 

                    Nura ALVARADO  C  Attending Clinician [phone]









Payers







           Payer Name Policy Type Policy Number Effective Date Expiration Date [facility]            [id number]  2021            



           [employer] MEDICA[employer]                       00:00:00              

 

           [employer] [place]            [id number]  2017            



           WOMEN                            00:00:00              







Problems







       Condition Condition Condition Status Onset  Resolution Last   Treating Co

mments 

Source



       Name   Details Category        Date   Date   Treatment Clinician        



                                                 Date                 

 

       Over   Over   Disease Active                              [facility]



       weight weight               3-16                               ity of



                                   00:00:                             [address]

 

       Herpes, Herpes, Disease Active                              [facility]



       vulvar vulvar               3-16                               ity of



                                   00:00:                             [address]

 

       Contracept Contracept Disease Active                              U

nivers



       abdiel    abdiel                  3-06                               ity of



       management management               00:00:                             [place]

[place]



                                   [address] [place] [address]

 

       Vaginal Vaginal Disease Active                              [facility]



       yeast  yeast                3-06                               [facility]



       infection infection               00:00:                             [place]

[place]



                                   [address] [place] [address]







Allergies, Adverse Reactions, Alerts







       Allergy Allergy Status Severity Reaction(s) Onset  Inactive Treating Comm

ents 

Source



       Name   Type                        Date   Date   Clinician        

 

       NO KNOWN Drug   Active                                           [facility]



       ALLERGIE Class                                                   ity of



       S                                                              [facility]







Social History







           Social Habit Start Date Stop Date  Quantity   Comments   Source

 

           Exposure to                       Not sure              [facility]



           SARS-CoV-2                                             [facility] [place]



           (event)                                                [facility]

 

           Alcohol intake 2021 Current               [facility]



                      00:00:00   00:00:00   non-drinker of            [place] [facility]

nitza



                                            alcohol               [facility]



                                            (finding)             

 

           Tobacco use and 2021 Never used            [facility]

y of



           exposure   00:00:00   00:00:00                         [facility]

 

           Sex Assigned At 1995                       [facility]

y of



           Birth      00:00:00   00:00:00                         [facility]









                Smoking Status  Start Date      Stop Date       Source

 

                Never smoker                                    [facility]







Medications







       Ordered Filled Start  Stop   Current Ordering Indication Dosage Frequency

 Signature

                    Comments            Components          Source



     Medication Medication Date Date Medication? Clinician                (SIG) 

          



     Name Name                                                   

 

     acetaminoph      2021- No             650mg      650 mg,           U

nivers



     en                                  Oral,           ity of



     (TYLENOL)      05:00: 03:59                          ONCE, 1           [place]

[facility]



     tablet 650      00   :00                           dose, Sun           Medi

nitza



     mg                                           21 at           Branch



                                                  0000, ASAP           

 

     azithromyci      2021- No        [id number] 1000mg      Take 2       

    [facility]



     n 500 mg                                tablets by           ity 

of



     tablet      00:00: 04:59                          mouth once           [place]

[facility]



               00   :00                           now for 1           Medical



                                                  dose.           [place]

 

     azithromyci      2021- No        [id number] 1000mg      Take 2       

    [facility]



     n 500 mg                                tablets by           ity 

of



     tablet      00:00: 04:59                          mouth once           [place]

[facility]



               00   :00                           now for 1           Medical



                                                  dose.           [place]

 

     azithromyci      2021- No        [id number] 1000mg      Take 2       

    [facility]



     n 500 mg                                tablets by           ity 

of



     tablet      00:00: 04:59                          mouth once           [place]

[facility]



               00   :00                           now for 1           Medical



                                                  dose.           [place]

 

     azithromyci      2021- No        [id number] 1000mg      Take 2       

    [facility]



     n 500 mg                                tablets by           ity 

of



     tablet      00:00: 04:59                          mouth once           [place]

[facility]



               00   :00                           now for 1           Medical



                                                  dose.           [place]

 

     azithromyci      2021- No        [id number] 1000mg      Take 2       

    [facility]



     n 500 mg                                tablets by           ity 

of



     tablet      00:00: 04:59                          mouth once           [place]

[facility]



               00   :00                           now for 1           Medical



                                                  dose.           [place]

 

     acyclovir       2020- No        [id number] 400mg      Take 1          

 [facility]



     400 mg      3-16 -16                          tablet by           ity of



     tablet      00:00: 04:59                          mouth 2           [place]



               00   :00                           (two)           [facility]



                                                  times           [facility]



                                                  daily for           



                                                  30 days.           

 

     acyclovir       2020- No        [id number] 400mg      Take 1          

 [facility]



     400 mg      3-16 -16                          tablet by           ity of



     tablet      00:00: 04:59                          mouth 2           [place]



               00   :00                           (two)           [facility]



                                                  times           [facility]



                                                  daily for           



                                                  30 days.           

 

     medroxyPROG            Yes       [id number] 150mg                     U

nivers



     ESTERone      1-26                                              ity of



     (DEPO-PROVE      16:45:                                              [place]



     RA)       00                                                Medical



     injection                                                        Branch



     150 mg                                                        

 

     medroxyPROG            Yes       [id number] 150mg                     U

nivers



     ESTERone      -26                                              ity of



     (DEPO-PROVE      16:45:                                              [place]



     RA)       00                                                Medical



     injection                                                        Branch



     150 mg                                                        

 

     medroxyPROG            Yes       [id number] 150mg                     U

nivers



     ESTERone      -                                              ity of



     (DEPO-PROVE      16:45:                                              [place]



     RA)       00                                                Medical



     injection                                                        Branch



     150 mg                                                        

 

     medroxyPROG            Yes       [id number] 150mg                     U

nivers



     ESTERone      -                                              ity of



     (DEPO-PROVE      16:45:                                              [place]



     RA)       00                                                Medical



     injection                                                        Branch



     150 mg                                                        

 

     medroxyPROG      2021- No        [id number] 150mg                     

[facility]



     ESTERone      -                                         ity of



     (DEPO-PROVE      16:45: 19:24                                         [place]



     RA)       00   :33                                          Medical



     injection                                                        Branch



     150 mg                                                        

 

     medroxyPROG      2021- No        [id number] 150mg                     

[facility]



     ESTERone      -                                         ity of



     (DEPO-PROVE      16:45: 19:24                                         [place]



     RA)       00   :33                                          Medical



     injection                                                        Branch



     150 mg                                                        

 

     medroxyPROG      2021- No        [id number] 150mg                     

Univers



     ESTERone      -                                         ity of



     (DEPO-PROVE      16:45: 19:24                                         [place]



     RA)       00   :33                                          Medical



     injection                                                        Branch



     150 mg                                                        

 

     Condoms            Yes       [id number]           Use as           Univ

ers



     Latex      6-17                               directed           ity of



     Lubricated      00:00:                                              [place]



     (KIMONO      00                                                Medical



     TEXTURED                                                        Branch



     CONDOMS)                                                        



     Estef[place]                                                        

 

     Condoms            Yes       [id number]           Use as           Univ

ers



     Latex      6-17                               directed           ity of



     Lubricated      00:00:                                              [place]



     (KIMONO                                                      Medical



     TEXTURED                                                        Branch



     CONDOMS)                                                        



     [place]ef[place]                                                        

 

     Condoms      2015-0      Yes       [id number]           Use as           Univ

ers



     Latex      6-17                               directed           ity of



     Lubricated      00:00:                                              [place]



     (KIMONO      00                                                Medical



     TEXTURED                                                        Branch



     CONDOMS)                                                        



     Estefany                                                        

 

     Condoms      -0      Yes       [id number]           Use as           Univ

ers



     Latex      6-17                               directed           ity of



     Lubricated      00:00:                                              [place]



     (KIMONO      00                                                Medical



     TEXTURED                                                        Branch



     CONDOMS)                                                        



     Estefany                                                        

 

     Condoms      -0      Yes       [id number]           Use as           Univ

ers



     Latex      6-17                               directed           ity of



     Lubricated      00:00:                                              [place]



     (KIMONO      00                                                Medical



     TEXTURED                                                        Branch



     CONDOMS)                                                        



     Estefany                                                        

 

     Condoms      -0      Yes       [id number]           Use as           Univ

ers



     Latex      6-17                               directed           ity of



     Lubricated      00:00:                                              [place]



     (KIMONO      00                                                Medical



     TEXTURED                                                        Branch



     CONDOMS)                                                        



     Estefany                                                        

 

     Condoms      -0      Yes       [id number]           Use as           Univ

ers



     Latex      6-17                               directed           ity of



     Lubricated      00:00:                                              [place]



     (KIMONO      00                                                Medical



     TEXTURED                                                        Branch



     CONDOMS)                                                        



     Estef[place]                                                        

 

     Condoms      -0      Yes       [id number]           Use as           Univ

ers



     Latex      6-17                               directed           ity of



     Lubricated      00:00:                                              [place]



     (KIMONO      00                                                Medical



     TEXTURED                                                        Branch



     CONDOMS)                                                        



     Estefany                                                        

 

     Condoms      -0      Yes       [id number]           Use as           Univ

ers



     Latex      6-17                               directed           ity of



     Lubricated      00:00:                                              [place]



     (KIMONO      00                                                Medical



     TEXTURED                                                        Branch



     CONDOMS)                                                        



     Estefany                                                        

 

     Condoms      -0      Yes       [id number]           Use as           Univ

ers



     Latex      6-17                               directed           ity of



     Lubricated      00:00:                                              [place]



     (KIMONO      00                                                Medical



     TEXTURED                                                        Branch



     CONDOMS)                                                        



     Estefany                                                        

 

     Condoms      -0      Yes       [id number]           Use as           Univ

ers



     Latex      6-17                               directed           ity of



     Lubricated      00:00:                                              [place]



     (KIMONO      00                                                Medical



     TEXTURED                                                        Branch



     CONDOMS)                                                        



     Estefany                                                        

 

     Condoms      -0      Yes       [id number]           Use as           Univ

ers



     Latex      6-17                               directed           ity of



     Lubricated      00:00:                                              [place]



     (KIMONO      00                                                Medical



     TEXTURED                                                        Branch



     CONDOMS)                                                        



     Estefany                                                        

 

     Condoms      -0      Yes       [id number]           Use as           Univ

ers



     Latex      6-17                               directed           ity of



     Lubricated      00:00:                                              [place]



     (KIMONO      00                                                Medical



     TEXTURED                                                        Branch



     CONDOMS)                                                        



     Estefany                                                        

 

     Condoms      -      Yes       [id number]           Use as           Univ

ers



     Latex      6-17                               directed           ity of



     Lubricated      00:00:                                              [place]



     (KIMONO      00                                                Medical



     TEXTURED                                                        Branch



     CONDOMS)                                                        



     Estefany                                                        







Immunizations







           Ordered    Filled Immunization Date       Status     Comments   Sourc

e



           Immunization Name Name                                        

 

           Influenza Virus            2020 Completed             [facility]

y of



           Vaccine Quad .5 mL            00:00:00                         [facility] [place]



           IM 6+ MO                                               Branch

 

           HPV9                  2020 Completed             [facility]



                                 00:00:00                         [facility]

 

           Influenza Virus            2020 Completed             [facility]

y of



           Vaccine Quad .5 mL            00:00:00                         [place] 

[facility]



           IM 6+ MO                                               [facility]

 

           HPV9                  2020 Completed             [facility]



                                 00:00:00                         [facility]

 

           Influenza Virus            2020 Completed             [facility]

y of



           Vaccine Quad .5 mL            00:00:00                         [place] 

[facility]



           IM 6+ MO                                               [facility]

 

           HPV9                  2020 Completed             [facility]



                                 00:00:00                         [facility]

 

           Influenza Virus            2020 Completed             [facility]

y of



           Vaccine Quad .5 mL            00:00:00                         [place] 

[facility]



           IM 6+ MO                                               [facility]

 

           HPV9                  2020 Completed             [facility]



                                 00:00:00                         [facility]

 

           Influenza Virus            2020 Completed             [facility]

y of



           Vaccine Quad .5 mL            00:00:00                         [place] 

[facility]



            6+ MO                                               Br[facility]

 

           HPV9                  2020 Completed             [facility]



                                 00:00:00                         [facility]

 

           Influenza Virus            2020 Completed             [facility]

y of



           Vaccine Quad .5 mL            00:00:00                         [place] 

[facility]



            6+ MO                                               Br[facility]

 

           HPV9                  2020 Completed             [facility]



                                 00:00:00                         [facility]

 

           Influenza Virus            2020 Completed             Univers[facility]

y of



           Vaccine Quad .5 mL            00:00:00                         [place] 

[facility]



            6+ MO                                               Br[facility]

 

           HPV9                  2020 Completed             [facility]



                                 00:00:00                         [facility]

 

           Influenza Virus            2020 Completed             [facility]

y of



           Vaccine Quad .5 mL            00:00:00                         [place] 

[facility]



            6+ MO                                               Branch

 

           HPV9                  2020 Completed             [facility]



                                 00:00:00                         [facility]

 

           Influenza Virus            2020 Completed             [facility]

y of



           Vaccine Quad .5 mL            00:00:00                         [place] 

[facility]



           IM 6+ MO                                               Branch

 

           HPV9                  2020 Completed             [facility]



                                 00:00:00                         [facility]

 

           Influenza Virus            2020 Completed             Univers[facility]

y of



           Vaccine Quad .5 mL            00:00:00                         [place] 

[facility]



           IM 6+ MO                                               Branch

 

           HPV9                  2020 Completed             [facility]



                                 00:00:00                         [facility]

 

           Influenza Virus            2020 Completed             Universit

y of



           Vaccine Quad .5 mL            00:00:00                         [place] 

[facility]



           IM 6+ MO                                               Branch

 

           HPV9                  2020 Completed             [facility]



                                 00:00:00                         [facility]

 

           Influenza Virus            2020 Completed             Universit

y of



           Vaccine Quad .5 mL            00:00:00                         [facility]



           IM 6+ MO                                               Branch

 

           HPV9                  2020 Completed             [facility]



                                 00:00:00                         [facility] [place]



                                                                  Branch

 

           Influenza Virus            2020 Completed             Universit

y of



           Vaccine Quad .5 mL            00:00:00                         [facility]



           IM 6+ MO                                               Branch

 

           HPV9                  2020 Completed             [facility]



                                 00:00:00                         [facility]



                                                                  Branch

 

           HPV                   2016 Completed             [facility]



                                 00:00:00                         [facility]



                                                                  Branch

 

           HPV                   2016 Completed             [facility]



                                 00:00:00                         [facility]



                                                                  Branch

 

           HPV                   2016 Completed             [facility]



                                 00:00:00                         [facility]



                                                                  Branch

 

           HPV                   2016 Completed             [facility]



                                 00:00:00                         [facility]



                                                                  Branch

 

           HPV                   2016 Completed             [facility]



                                 00:00:00                         [facility]



                                                                  Branch

 

           HPV                   2016 Completed             [facility]



                                 00:00:00                         [facility]



                                                                  Branch

 

           HPV                   2016 Completed             [facility]



                                 00:00:00                         [facility]



                                                                  Branch

 

           HPV                   2016 Completed             [facility]



                                 00:00:00                         [facility]



                                                                  Branch

 

           HPV                   2016 Completed             [facility]



                                 00:00:00                         [facility]



                                                                  Branch

 

           HPV                   2016 Completed             [facility]



                                 00:00:00                         [facility]



                                                                  Branch

 

           HPV                   2016 Completed             [facility]



                                 00:00:00                         [facility]



                                                                  Branch

 

           HPV                   2016 Completed             [facility]



                                 00:00:00                         [facility]



                                                                  Branch

 

           HPV                   2016 Completed             [facility]



                                 00:00:00                         [facility]



                                                                  Branch

 

           HPV                   2016 Completed             [facility]



                                 00:00:00                         [facility]



                                                                  Branch

 

           HPV                   2015 Completed             [facility]



                                 00:00:00                         [facility]



                                                                  Branch

 

           HPV                   2015 Completed             [facility]



                                 00:00:00                         [facility]



                                                                  Branch

 

           HPV                   2015 Completed             [facility]



                                 00:00:00                         [facility]



                                                                  Branch

 

           HPV                   2015 Completed             [facility]



                                 00:00:00                         [facility]



                                                                  Branch

 

           HPV                   2015 Completed             [facility]



                                 00:00:00                         [facility]



                                                                  Branch

 

           HPV                   2015 Completed             [facility]



                                 00:00:00                         [facility]



                                                                  Branch

 

           HPV                   2015 Completed             [facility]



                                 00:00:00                         [facility]



                                                                  Branch

 

           HPV                   2015 Completed             [facility]



                                 00:00:00                         [facility]



                                                                  Branch

 

           HPV                   2015 Completed             [facility]



                                 00:00:00                         [facility]



                                                                  Branch

 

           HPV                   2015 Completed             [facility]



                                 00:00:00                         [facility]



                                                                  Branch

 

           HPV                   2015 Completed             [facility]



                                 00:00:00                         [facility]



                                                                  Branch

 

           HPV                   2015 Completed             [facility]



                                 00:00:00                         [facility]



                                                                  Branch

 

           HPV                   2015 Completed             [facility]



                                 00:00:00                         [facility]



                                                                  Branch

 

           HPV                   2015 Completed             [facility]



                                 00:00:00                         [facility]



                                                                  Branch

 

           TDAP                  2014 Completed             [facility]



                                 00:00:00                         [facility]



                                                                  Branch

 

           TDAP                  2014 Completed             [facility]



                                 00:00:00                         [facility]



                                                                  Branch

 

           TDAP                  2014 Completed             [facility]



                                 00:00:00                         [facility]



                                                                  Branch

 

           TDAP                  2014 Completed             [facility]



                                 00:00:00                         [facility]



                                                                  Branch

 

           TDAP                  2014 Completed             [facility]



                                 00:00:00                         [facility]



                                                                  Branch

 

           TDAP                  2014 Completed             [facility]



                                 00:00:00                         [facility]



                                                                  Branch

 

           TDAP                  2014 Completed             [facility]



                                 00:00:00                         [facility]



                                                                  Branch

 

           TDAP                  2014 Completed             [facility]



                                 00:00:00                         [facility]



                                                                  Branch

 

           TDAP                  2014 Completed             [facility]



                                 00:00:00                         [facility]



                                                                  Branch

 

           TDAP                  2014 Completed             [facility]



                                 00:00:00                         [facility]



                                                                  Branch

 

           Tdap                  2014 Completed             [facility]



                                 00:00:00                         [facility]



                                                                  Branch

 

           Tdap                  2014 Completed             [facility]



                                 00:00:00                         [facility]



                                                                  Branch

 

           Tdap                  2014 Completed             [facility]



                                 00:00:00                         [facility]



                                                                  Branch

 

           TDAP                  2014 Completed             [facility]



                                 00:00:00                         [facility] [place]



                                                                  Branch







Vital Signs







             Vital Name   Observation Time Observation Value Comments     Source

 

             Systolic blood 2021 03:48:00 141 mm[Hg]                Univer

sity of



             pressure                                            [facility]

 

             Diastolic blood 2021 03:48:00 87 mm[Hg]                 Unive

rsity of



             pressure                                            [facility]

 

             Heart rate   2021 03:48:00 88 /min                   [facility]

 

             Body temperature 2021 03:48:00 38 Izabella                    [facility]

 

             Respiratory rate 2021 03:48:00 20 /min                   [facility]

 

             Body weight  2021 03:48:00 66.225 kg                 [facility]

 

             BMI          2021 03:48:00 23.57 kg/m2               [facility]

 

             Oxygen saturation in 2021 03:48:00 100 /min                  

[facility]



             Arterial blood by                                        [place] [facility]

nitza



             Pulse oximetry                                        Branch

 

             Systolic blood 2021 19:23:00 119 mm[Hg]                Univer

sity of



             pressure                                            [facility] [place]



                                                                 Branch

 

             Diastolic blood 2021 19:23:00 76 mm[Hg]                 Unive

rsity of



             pressure                                            [facility]

 

             Heart rate   2021 19:23:00 75 /min                   Universi

ty of



                                                                 [facility]

 

             Body temperature 2021 19:23:00 36.44 Izabella                 Univ

ersity of



                                                                 [facility]

 

             Respiratory rate 2021 19:23:00 16 /min                   Univ

ersity of



                                                                 [facility]

 

             Body height  2021 19:23:00 167.6 cm                  Universi

ty of



                                                                 [facility]

 

             Body weight  2021 19:23:00 65.273 kg                 Universi

ty of



                                                                 [facility]

 

             BMI          2021 19:23:00 23.23 kg/m2               Universi

ty of



                                                                 [facility]

 

             Systolic blood 2020 19:46:00 120 mm[Hg]                Univer

sity of



             pressure                                            [facility] [place]



                                                                 Branch

 

             Diastolic blood 2020 19:46:00 74 mm[Hg]                 Unive

rsity of



             pressure                                            [facility]

 

             Heart rate   2020 19:46:00 81 /min                   Universi

ty of



                                                                 [facility]

 

             Body temperature 2020 19:46:00 36.67 Izabella                 Univ

ersity of



                                                                 [facility] [place]



                                                                 Branch

 

             Respiratory rate 2020 19:46:00 16 /min                   Univ

ersity of



                                                                 [facility]

 

             Body height  2020 19:46:00 167.6 cm                  Universi

ty of



                                                                 [facility] [place]



                                                                 Branch

 

             Body weight  2020 19:46:00 76.233 kg                 Universi

ty of



                                                                 [facility]



                                                                 Branch

 

             BMI          2020 19:46:00 27.13 kg/m2               Universi

ty of



                                                                 [facility] [place]



                                                                 Branch

 

             Systolic blood 2020 19:46:00 120 mm[Hg]                Univer

sity of



             pressure                                            [facility] [place]



                                                                 Branch

 

             Diastolic blood 2020 19:46:00 74 mm[Hg]                 Unive

rsity of



             pressure                                            [facility] [place]



                                                                 Branch

 

             Heart rate   2020 19:46:00 81 /min                   Universi

ty of



                                                                 [facility]

 

             Body temperature 2020 19:46:00 36.67 Izabella                 Univ

ersity of



                                                                 [facility] [place]



                                                                 Branch

 

             Respiratory rate 2020 19:46:00 16 /min                   [facility]

 

             Body height  2020 19:46:00 167.6 cm                  [facility]

 

             Body weight  2020 19:46:00 76.233 kg                 [facility]

 

             BMI          2020 19:46:00 27.13 kg/m2               [facility]







Procedures







                Procedure       Date / Time Performed Performing Clinician Sourc

e

 

                COVID-19 (ID NOW RAPID 2021 04:00:00 Lizzy Moss [facility]



                TESTING)                                        [facility] [place]

 

                NOTICE OF PRIVACY 2021 03:23:08 Doctor Unassigned, No [facility]



                PRACTICES                       Name            [facility]

 

                CONSENT/REFUSAL FOR 2021 03:21:46 Doctor Unassigned, No [facility]



                DIAGNOSIS AND                   [facility]



                TREATMENT                                       

 

                POCT PREGNANCY TEST 2021 19:33:00 Latosha Sofia [facility]

 

                ASSIGNMENT OF BENEFITS 2021 19:04:18 Doctor Unassigned, No

 [facility]

 

                POCT PREGNANCY TEST 2020 21:48:00 Latosha Sofia [facility]

 

                PAP SMEAR-LIQUID 2020 20:49:00 Latosha Sofia [facility]



                BASED-CP                                        [facility]

 

                HIV 1/2 AG-AB WITH 2020 20:45:00 Latosha Sofia [facility]



                REFLEX                                          [facility]

 

                GARDASIL 9 (HPV 9V) 2020 20:02:45 Latosha Sofia [facility]



                VACCINE                                         [facility]

 

                FLU VACC ([id number]), 2020 20:01:51 [facility]



                6+ MONTH[facility], IM, QUAD                                 [facility]

ch

 

                ASSIGNMENT OF BENEFITS 2020 19:15:35 Doctor Unassigned, No

 [facility] acute hospital







Encounters







        Start   End     Encounter Admission Attending Care    Care    Encounter 

Source



        Date/Time Date/Time Type    Type    Clinicians Facility Department ID   

   

 

        2021         Emergency                 [facility]    [id number] 

Univers



        12:14:10                                                         [facility]

 

        2021 Outpatient R               [facility]    [id number]

-20 Univers



        10:30:00 10:30:00                                         [id number]  ity of



                                                                        [facility]

 

        2021 Outpatient R               [facility]    [id number]

[id number] Univers



        10:30:00 10:30:00                                                 ity of



                                                                        [facility]

 

        2021 Outpatient R               [facility]    [id number]

-20 Univers



        13:45:00 13:45:00                                         [id number]  ity of



                                                                        [facility]

 

        2021 Outpatient R               [facility]    [id number]

-20 Univers



        15:00:00 15:00:00                                         [id number]  ity [facility]

 

        2021 Outpatient R               [facility]    [id number]

-20 Univers



        15:30:00 15:30:00                                         [id number]  ity [facility]

 

        2021 Outpatient R               [facility]    [id number]

-20 Univers



        10:30:00 10:30:00                                         [id number]  ity of



                                                                        [facility]

 

        2021 Outpatient R               [facility]    [id number]

[id number] Univers



        10:30:00 10:30:00                                                 ity [facility]

 

        2021 [facility]         [facility]    [id number]

[id number] Univers



        22:54:00 23:45:00                 Janeth S Carlos [id number]         i

ty of



                                                [place] [id number]         [facility]  [phone]         Medi

nitza



                                                        084             Branch

 

        2021 Orders          Doctor  JOY    [id number]

[id number] Univers



        00:00:00 00:00:00 Only            Unassigned, TANVI   [id number]       

  ity of



                                        [facility] [id number]         [place]

as



                                                        [phone]         Medi

nitza



                                                        009             Branch

 

        2021 Outpatient R       AKINJAYDEN, [facility]    [id number]

3N-20 Univers



        13:30:00 13:30:00                 LATOSHA                 [id number]  ity o

[facility]

 

        2021 Outpatient R       NURA, [facility]    [id number]

[id number] Univers



        13:30:00 13:30:00                 LATOSHA brodyy o

[facility]

 

        2021 Telephone         [facility]    [id number]

[id number] Univers



        00:00:00 00:00:00                 Latosha C OB/GYN  [id number]        

 ity of



                                                REGIONAL [id number]         [place]

as



                                                MATERNAL [phone]         Med

ical



                                                & CHILD [address] [place] [address]                 

 

        2021 Office          [facility]    [id number]

[id number] Univers



        14:04:52 15:11:59 Visit           Latosha C OB/GYN  [id number]        

 ity of



                                                REGIONAL [id number]         [place]

as



                                                MATERNAL [phone]         Med

ical



                                                & CHILD [address] [place] [address]                 

 

        2021 Outpatient R       NURA, [facility]    [id number]

[id number] Univers



        13:45:00 13:45:00                 LATOSHA brodyopal o

[facility]

 

        2021 Outpatient R       NURA, [facility]    [id number]

3N-20 Univers



        10:45:00 10:45:00                 LATOSHA                 [id number]  mary gracey o

[facility]

 

        2021 Orders          Doctor HAMILTON    [id number]

79 Univers



        00:00:00 00:00:00 Only            Unassigned, TANVI   350.1.13.10       

  ity of



                                        [place] \[facility] [place] [facility]\"" 4.2.7.2.686         [place]

as



                                                        [phone]         [address]

 

        2020 Office          [facility]    [id number]

[id number] 



        14:37:03 15:50:11 Visit           [facility]  350.1.13.10        

 



                                                REGIONAL 4.2.7.2.686         



                                                MATERNAL [phone]         



                                                & CHILD [address] [place] [address]                 

 

        2020 Office          Nura [facility]    [id number]

2647 Univers



        14:37:03 15:50:11 Visit           Latosha [facility]  350.1.13.10        

 ity of



                                                REGIONAL 4.2.7.2.686         [place]

as



                                                MATERNAL [phone]         Med

ical



                                                & CHILD [address] [place] [address]                 

 

        [facility]    [id number]

3N[facility]20 Univers



        14:30:00 14:30:00                 LATOSHA                 [id number]  ity o

f



                                                                        [facility]

 

        2020 Outpatient R       NURA [facility]    [id number]

[address] Univers



        14:30:00 14:30:00                 LATOSHA jaime o

f



                                                                        [facility]

 

        2020 Orders          Doctor  JOY    [id number]

28 Univers



        00:00:00 00:00:00 Only            Unassigned, TANVI   350.1.13.10       

  ity of



                                        [place] \[facility] [place] [facility]\"" 4.2.7.2.686         [place]

as



                                                        [phone]         [address]







Results







           Test Description Test Time  Test Comments Results    Result Comments 

Source









                    CT/NG, NAAT, URINE  2022 07:18:49 









                      Test Item  Value      Reference Range Interpretation Comme

nts









             GONORRHEA, NAAT (test TEST NOT PERFORMED NEGATIVE                  

Unable to perform testing,



             code = 62911)                                        specimen not r

eceived.Charges



                                                                 adjusted as gregory

licable.

 

             CHLAMYDIA, NAAT (test TEST NOT PERFORMED NEGATIVE                  

Unable to perform testing,



             code = 10993)                                        specimen not r

eceived.Charges



                                                                 adjusted as gregory

licable.



                                                                 UNLESS OTHERWIS

E INDICATED,



                                                                 ALL TESTING PER

FORMED



                                                                 ATCLINICAL PATH

OLOGY



                                                                 LABORATORIES, I

[place].  [address] [place] [address] 

[id number]



                                                                 LABORATORY DIRE

CTOR:  DONTA DOOLEY M.D.

      CLIA



                                                                 NUMBER [id number]

[id number]  CAP



                                                                 ACCREDITATION N

O. [id number]



VAGINAL PATHOGENS DNA [id number] 16:54:30





             Test Item    Value        Reference Range Interpretation Comments

 

             CANDIDA SPECIES (test code = ) POSITIVE     NEGATIVE     A    

        

 

             G. VAGINALIS (test code = ) NEGATIVE     NEGATIVE             

     

 

             T. VAGINALIS (test code = ) NEGATIVE     NEGATIVE             

     



[id number] 05:27:54





             Test Item    Value        Reference Range Interpretation Comments

 

             RPR RESULT (test code = NON-REACTIVE NON-REACTIVE              



             3501)                                               

 

             RPR TITER (test code = 3500) NOT INDIC. TITER NOT INDIC.           

     



HIV 1/2 4TH GEN, RFLX [id number] 04:48:45





             Test Item    Value        Reference Range Interpretation Comments

 

             HIV 1/2 4TH GEN, RFLX CONF (test NON-REACTIVE NON-REACTIVE         

     



             code = 3514)                                        



HEPATITIS PANEL, [id number] 04:48:45





             Test Item    Value        Reference Range Interpretation Comments

 

             HEPATITIS A IgM (test NON-REACTIVE NON-REACTIVE              



             code = 91391)                                        

 

             HEPATITIS B CORE IgM NON-REACTIVE NON-REACTIVE              



             (test code = 4644)                                        

 

             HEPATITIS B SURF AG NON-REACTIVE NON-REACTIVE              



             (test code = 2739)                                        

 

             HEPATITIS C ANTIBODY NON-REACTIVE NON-REACTIVE              



             (test code = 4675)                                        

 

             INTERPRETATION (NOTE)                                      Hepatiti

s A



             HEPATITIS A: (test code                                        sero

logy shows no



             = 2552)                                             evidence of acu

te



                                                                 hepatitis A.

 

             INTERPRETATION (NOTE)                                      Hepatiti

s B



             HEPATITIS B: (test code                                        sero

logy shows no



             = 36149)                                            evidence of acu

te



                                                                 hepatitis B and

no



                                                                 indication of



                                                                 exposure to



                                                                 hepatitis B vir

us



                                                                 in the previous



                                                                 dixie eight



                                                                 months.

 

             INTERPRETATION (NOTE)                                      Hepatiti

s C



             HEPATITIS C: (test code                                        sero

logy shows no



             = 77357)                                            evidence of



                                                                 exposure to



                                                                 hepatitisC viru

s



                                                                 at this time.  

It



                                                                 can take up to 

12



                                                                 months after



                                                                 exposure tothe



                                                                 hepatitis C vir

us



                                                                 for antibodies 

to



                                                                 become detectab

le



                                                                 in the     bloo

d



                                                                 in certain



                                                                 patients.



COVID-19 (ID NOW RAPID TESTING)2021 04:15:26





             Test Item    Value        Reference Range Interpretation Comments

 

             SARS-CoV-2 Rapid ID NOW Positive     Not Detected A            



             (test code = 20084-4)                                        

 

             JADA (test code = JADA) ID NOW COVID-19 Assay                        

   



                          is an isothermal                           



                          nucleic acid                           



                          amplification test                           



                          intended for the                           



                          qualitative detection                           



                          of nucleic acid from                           



                          SARS-CoV-2 viral RNA                           



                          in nasopharyngeal (NP)                           



                          specimens. It is used                           



                          under Emergency Use                           



                          Authorization (EUA) by                           



                          FDA. The limit of                           



                          detection (LOD) of the                           



                          assay is 125 Genome                           



                          Equivalents/mL. A                           



                          positive result is                           



                          indicative of the                           



                          presence of SARS-CoV-2                           



                          RNA. ?Clinical                           



                          correlation with                           



                          patient history and                           



                          other diagnostic                           



                          information is                           



                          necessary to determine                           



                          patient infection                           



                          status. A negative                           



                          (Not Detected) result                           



                          does not preclude                           



                          SARS-CoV-2 infection.                           



                          In patients with                           



                          clinical symptoms and                           



                          other tests that are                           



                          consistent with                           



                          SARS-CoV-2 infection,                           



                          negative results                           



                          should be treated as                           



                          presumptive negative                           



                          and a new specimen                           



                          should be tested with                           



                          alternative PCR                           



                          molecular test.                           



                          Invalid: Please                           



                          collect a new specimen                           



                          for repeat patient                           



                          testing if clinically                           



                          indicated.                             

 

             Lab Interpretation Abnormal                               



             (test code = 04470-2)                                        



[facility] PREGNANCY [id number] 19:35:00





             Test Item    Value        Reference Range Interpretation Comments

 

             POCT PREG (test code = 1605) Negative                              

 

 

             On board controls acceptable with C Yes                            

        



             Line (test code = 3574)                                        

 

             POCT PREG LOT # (test code = 3575)                                 

       

 

             POCT PREG TEST  DATE (test                                   

     



             code = 3576)                                        



[facility] PREGNANCY [id number] 19:35:00





             Test Item    Value        Reference Range Interpretation Comments

 

             POCT PREG (test code = 1605) Negative                              

 

 

             On board controls acceptable with C Yes                            

        



             Line (test code = 3574)                                        

 

             POCT PREG LOT # (test code = 3575)                                 

       

 

             POCT PREG TEST  DATE (test                                   

     



             code = 3576)                                        



[facility] PREGNANCY [id number] 19:35:00





             Test Item    Value        Reference Range Interpretation Comments

 

             POCT PREG (test code = 1605) Negative                              

 

 

             On board controls acceptable with C Yes                            

        



             Line (test code = 3574)                                        

 

             POCT PREG LOT # (test code = 3575)                                 

       

 

             POCT PREG TEST  DATE (test                                   

     



             code = 3576)                                        



[facility] [id number] 05:56:00





             Test Item    Value        Reference Range Interpretation Comments

 

             HIV          Negative     Negative                  



             Semi-quantitative                                        



             (test code =                                        



             88056-2)                                            

 

             JADA (test code = Non-reactive for HIV-1                           



             JADA)         antigen and HIV-1/HIV-2                           



                          antibodies. ?No                           



                          laboratory evidence of                           



                          HIV infection. ?Repeat in                           



                          2-4 weeks if acute HIV                           



                          infection is suspected.                           



[facility] [id number] 05:56:00





             Test Item    Value        Reference Range Interpretation Comments

 

             HIV          Negative     Negative                  



             Semi-quantitative                                        



             (test code =                                        



             01319-9)                                            

 

             JADA (test code = Non-reactive for HIV-1                           



             JADA)         antigen and HIV-1/HIV-2                           



                          antibodies. ?No                           



                          laboratory evidence of                           



                          HIV infection. ?Repeat in                           



                          2-4 weeks if acute HIV                           



                          infection is suspected.                           



[facility] [id number] 21[facility]48[facility]00





             Test Item    Value        Reference Range Interpretation Comments

 

             POCT PREG (test code = 1605) Negative                              

 

 

             On board controls acceptable with C Yes                            

        



             Line (test code = 3574)                                        

 

             POCT PREG LOT # (test code = 3575)                                 

       

 

             POCT PREG TEST  DATE (test                                   

     



             code = 3576)                                        



[facility] [id number] 21:48:00





             Test Item    Value        Reference Range Interpretation Comments

 

             POCT PREG (test code = 1605) Negative                              

 

 

             On board controls acceptable with C Yes                            

        



             Line (test code = 3574)                                        

 

             POCT PREG LOT # (test code = 3575)                                 

       

 

             POCT PREG TEST  DATE (test                                   

     



             code = 3576)                                        



[facility]

## 2022-06-13 NOTE — ER
Nurse's Notes                                                                                     

 [facility]                                                                 

Name: Diana Suárez                                                                               

Age: 27 yrs                                                                                       

Sex: Female                                                                                       

: 1995                                                                                   

MRN: [id number]                                                                                   

Arrival Date: 2022                                                                          

Time: 09:36                                                                                       

Account#: [id number]                                                                            

Bed Waiting                                                                                       

Private MD:                                                                                       

Diagnosis: Acute upper respiratory infection, unspecified                                         

                                                                                                  

Presentation:                                                                                     

                                                                                             

10:23 Chief complaint: Patient states: cough, congestion, runny nose, sneezing for about 3-4  vg1 

      days. Coronavirus screen: Vaccine status: Patient reports being unvaccinated. Client        

      denies travel out of the [place] in the last 14 days. Ebola Screen: Patient denies             

      exposure to infectious person. Patient denies travel to an Ebola-affected area in the       

      21 days before illness onset. Initial Sepsis Screen: Does the patient meet any 2            

      criteria? No. Patient's initial sepsis screen is negative. Does the patient have a          

      suspected source of infection? No. Patient's initial sepsis screen is negative. Risk        

      Assessment: Do you want to hurt yourself or someone else? Patient reports no desire to      

      harm self or others. Onset of symptoms was Amberly 10, 2022.                                   

10:23 Method Of Arrival: Ambulatory                                                           vg1 

10:23 Acuity: ROSE MARIE 4                                                                           vg1 

                                                                                                  

Triage Assessment:                                                                                

10:24 General: Appears in no apparent distress. comfortable, Behavior is calm, cooperative.   vg1 

      Pain: Denies pain. Respiratory: Reports cough that is Airway is patent Respiratory          

      effort is even, unlabored.                                                                  

11:12 Respiratory: Breath sounds are clear.                                                   vg1 

                                                                                                  

OB/GYN:                                                                                           

10:24 LMP 2022                                                                           vg1 

                                                                                                  

Historical:                                                                                       

- Allergies:                                                                                      

10:24 No Known Allergies;                                                                     vg1 

- Home Meds:                                                                                      

10:24 None [Active];                                                                          vg1 

- PMHx:                                                                                           

10:24 None;                                                                                   vg1 

- PSHx:                                                                                           

10:24 None;                                                                                   vg1 

                                                                                                  

- Immunization history:: Client reports having NOT received the Covid vaccine.                    

- Social history:: Smoking status: Reported history of juuling and/or vaping.                     

- Family history:: not pertinent.                                                                 

- Hospitalizations: : No recent hospitalization is reported.                                      

                                                                                                  

                                                                                                  

Screenin:11 Abuse screen: Denies threats or abuse. Nutritional screening: No deficits noted.        vg1 

      Tuberculosis screening: No symptoms or risk factors identified. Fall Risk None              

      identified.                                                                                 

                                                                                                  

Assessment:                                                                                       

10:26 Reassessment: Pt contact information [phone] or [phone].                      vg1 

                                                                                                  

Vital Signs:                                                                                      

10:23  / 77; Pulse 67; Resp 16; Temp 98.6(O); Pulse Ox 99% on R/A; Weight 58.97 kg;     vg1 

      Height 5 ft. 7 in. (170.18 cm); Pain 0/10;                                                  

10:23 Body Mass Index 20.36 (58.97 kg, 170.18 cm)                                             vg1 

                                                                                                  

ED Course:                                                                                        

09:36 Patient arrived in ED.                                                                  rg4 

10:05 Kj Fried MD is Attending Physician.                                                rn  

10:24 Triage completed.                                                                       vg1 

10:24 Arm band placed on.                                                                     vg1 

10:28 COVID swab sent to lab. Flu and/or RSV swab sent to lab.                                vg1 

11:11 Patient has correct armband on for positive identification.                             vg1 

11:11 No provider procedures requiring assistance completed. Patient did not have IV access   vg1 

      during this emergency room visit.                                                           

                                                                                                  

Administered Medications:                                                                         

No medications were administered                                                                  

                                                                                                  

                                                                                                  

Medication:                                                                                       

11:12 VIS not applicable for this client.                                                     vg1 

                                                                                                  

Outcome:                                                                                          

10:31 Discharge ordered by MD.                                                                rn  

11:12 Discharged to home ambulatory.                                                          vg1 

11:12 Condition: good                                                                             

11:12 Discharge instructions given to patient, Instructed on discharge instructions, follow       

      up and referral plans. Demonstrated understanding of instructions, follow-up care.          

11:12 Patient left the ED.                                                                    vg1 

                                                                                                  

Signatures:                                                                                       

Kj Fried MD MD   rn                                                   

Polina Leggett                                 rg4                                                  

Gladis Leggett RN                    RN   vg1                                                  

                                                                                                  

**************************************************************************************************

## 2022-06-13 NOTE — EDPHYS
Physician Documentation                                                                           

 [facility]                                                                 

Name: Diana Suárez                                                                               

Age: 27 yrs                                                                                       

Sex: Female                                                                                       

: 1995                                                                                   

MRN: [id number]                                                                                   

Arrival Date: 2022                                                                          

Time: 09:36                                                                                       

Account#: [id number]                                                                            

Bed Waiting                                                                                       

Private MD:                                                                                       

ED Physician Kj Fried                                                                         

HPI:                                                                                              

                                                                                             

10:27 This 27 yrs old  Female presents to ER via Ambulatory with complaints of        rn  

      Congestion, Runny Nose, Cough.                                                              

10:27 The patient or guardian reports cough, flu symptoms, low-grade fever. Onset: The        rn  

      symptoms/episode began/occurred 4 day(s) ago. Severity of symptoms: At their worst the      

      symptoms were mild, in the emergency department the symptoms have improved. Modifying       

      factors: The symptoms are alleviated by nothing, the symptoms are aggravated by             

      nothing. Associated signs and symptoms: Pertinent positives: rhinorrhea, Pertinent          

      negatives: chest pain, diarrhea, vomiting. The patient has not experienced similar          

      symptoms in the past. The patient has not recently seen a physician. Pt reports             

      daughter diagnosed with "viral illness" recently by pediatrician, put on abx, is doing      

      better. 2 days later patient began with fever/chills/congestion/cough, feels much           

      better now, but work wont let her return until evaluated and has neg covid. Denies sob.     

      No hemoptysis. .                                                                            

                                                                                                  

OB/GYN:                                                                                           

10:24 LMP 2022                                                                           vg1 

                                                                                                  

Historical:                                                                                       

- Allergies:                                                                                      

10:24 No Known Allergies;                                                                     vg1 

- Home Meds:                                                                                      

10:24 None [Active];                                                                          vg1 

- PMHx:                                                                                           

10:24 None;                                                                                   vg1 

- PSHx:                                                                                           

10:24 None;                                                                                   vg1 

                                                                                                  

- Immunization history:: Client reports having NOT received the Covid vaccine.                    

- Social history:: Smoking status: Reported history of juuling and/or vaping.                     

- Family history:: not pertinent.                                                                 

- Hospitalizations: : No recent hospitalization is reported.                                      

                                                                                                  

                                                                                                  

ROS:                                                                                              

10:27 Constitutional: Negative for fever, chills, and weight loss, Eyes: Negative for injury, rn  

      pain, redness, and discharge, Neck: Negative for injury, pain, and swelling,                

      Cardiovascular: Negative for chest pain, palpitations, and edema, Respiratory: Negative     

      for shortness of breath, wheezing, and pleuritic chest pain, Abdomen/GI: Negative for       

      abdominal pain, nausea, vomiting, diarrhea, and constipation, Back: Negative for injury     

      and pain, MS/Extremity: Negative for injury and deformity, Skin: Negative for injury,       

      rash, and discoloration, Neuro: Negative for headache, weakness, numbness, tingling,        

      and seizure.                                                                                

                                                                                                  

Exam:                                                                                             

10:27 Constitutional:  This is a well developed, well nourished patient who is awake, alert,  rn  

      and in no acute distress. Head/Face:  Normocephalic, atraumatic. Eyes:  Periorbital         

      areas with no swelling, redness, or edema. ENT:  No stridor Neck:  Trachea midline, +       

      non-tender cervical LAD Cardiovascular:  Regular rate and rhythm.  No pulse deficits.       

      Respiratory:  No increased work of breathing, no retractions or nasal flaring. Skin:        

      Warm, dry with normal turgor.  Normal color with no rashes, no lesions, and no evidence     

      of cellulitis. MS/ Extremity:  Pulses equal, no cyanosis.  Neurovascular intact.  Full,     

      normal range of motion.  Equal circumference. Neuro:  Awake and alert, GCS 15               

                                                                                                  

Vital Signs:                                                                                      

10:23  / 77; Pulse 67; Resp 16; Temp 98.6(O); Pulse Ox 99% on R/A; Weight 58.97 kg;     vg1 

      Height 5 ft. 7 in. (170.18 cm); Pain 0/10;                                                  

10:23 Body Mass Index 20.36 (58.97 kg, 170.18 cm)                                             vg1 

                                                                                                  

MDM:                                                                                              

10:05 Patient medically screened.                                                             rn  

10:27 Differential Diagnosis: Influenza Upper Respiratory Infection Viral Syndrome Other      rn  

      COVID. Data reviewed: vital signs, nurses notes, and as a result, I will discharge          

      patient. Counseling: I had a detailed discussion with the patient and/or guardian           

      regarding: the historical points, exam findings, and any diagnostic results supporting      

      the discharge/admit diagnosis, the need for outpatient follow up, to return to the          

      emergency department if symptoms worsen or persist or if there are any questions or         

      concerns that arise at home. Special discussion: I discussed with the patient/guardian      

      in detail that at this point there is no indication for admission to the hospital. It       

      is understood, however, that if the symptoms persist or worsen the patient needs to         

      return immediately for re-evaluation. ED course: Pt does not want to stay for results,      

      told her could call her with results since stable and no oxygen requirement, will not       

      likely need treatment and too late for tamiflu anyway. Pt to stay at home quarantined       

      until given results. .                                                                      

                                                                                                  

                                                                                             

10:23 Order name: SARS-COV-2 RT PCR (Document "Date of Onset" if Symptomatic)                 vg1 

                                                                                             

10:23 Order name: Flu                                                                         vg1 

                                                                                                  

Administered Medications:                                                                         

No medications were administered                                                                  

                                                                                                  

                                                                                                  

Disposition Summary:                                                                              

22 10:31                                                                                    

Discharge Ordered                                                                                 

      Location: Home                                                                          rn  

      Problem: new                                                                            rn  

      Symptoms: have improved                                                                 rn  

      Condition: Stable                                                                       rn  

      Diagnosis                                                                                   

        - Acute upper respiratory infection, unspecified                                      rn  

      Followup:                                                                               rn  

        - With: Private Physician                                                                  

        - When: As needed                                                                          

        - Reason: Recheck today's complaints, Re-evaluation by your physician                      

      Discharge Instructions:                                                                     

        - Discharge Summary Sheet                                                             rn  

        - Upper Respiratory Infection, Adult                                                  rn  

        - Viral Respiratory Infection                                                         rn  

      Forms:                                                                                      

        - Medication Reconciliation Form                                                      rn  

        - Thank You Letter                                                                    rn  

        - Antibiotic Education                                                                rn  

        - Prescription Opioid Use                                                             rn  

        - Work release form                                                                   vg1 

Signatures:                                                                                       

Dispatcher [employer]                           Kj Stevenson MD MD rn Garcia, Victoria, RN RN   vg1                                                  

                                                                                                  

**************************************************************************************************